# Patient Record
Sex: MALE
[De-identification: names, ages, dates, MRNs, and addresses within clinical notes are randomized per-mention and may not be internally consistent; named-entity substitution may affect disease eponyms.]

---

## 2022-08-25 ENCOUNTER — APPOINTMENT (OUTPATIENT)
Dept: AFTER HOURS CARE | Facility: EMERGENCY ROOM | Age: 52
End: 2022-08-25

## 2022-08-25 DIAGNOSIS — L23.7 ALLERGIC CONTACT DERMATITIS DUE TO PLANTS, EXCEPT FOOD: ICD-10-CM

## 2022-08-25 PROBLEM — Z00.00 ENCOUNTER FOR PREVENTIVE HEALTH EXAMINATION: Status: ACTIVE | Noted: 2022-08-25

## 2022-08-25 PROCEDURE — 99203 OFFICE O/P NEW LOW 30 MIN: CPT | Mod: NC,95

## 2022-08-25 NOTE — END OF VISIT
[Time Spent: ___ minutes] : I have spent [unfilled] minutes of time on the encounter. [>50% of the face to face encounter time was spent on counseling and/or coordination of care for ___] : Greater than 50% of the face to face encounter time was spent on counseling and/or coordination of care for [unfilled] 55

## 2022-10-03 NOTE — PHYSICAL EXAM
[EOMI] : extraocular movements intact [No Joint Swelling] : no joint swelling [Normal Gait] : normal gait [Speech Grossly Normal] : speech grossly normal [Normal Mood] : the mood was normal [Well Nourished] : well nourished [Coordination Grossly Intact] : coordination grossly intact [No Focal Deficits] : no focal deficits [Normal Affect] : the affect was normal [Normal Insight/Judgement] : insight and judgment were intact [No Acute Distress] : no acute distress [Well Developed] : well developed [Well-Appearing] : well-appearing [Normal Sclera/Conjunctiva] : normal sclera/conjunctiva [Normal Outer Ear/Nose] : the outer ears and nose were normal in appearance [No JVD] : no jugular venous distention [No Respiratory Distress] : no respiratory distress  [No Accessory Muscle Use] : no accessory muscle use [de-identified] : erythematous, pruritic rash on legs and buttocks

## 2022-10-03 NOTE — HISTORY OF PRESENT ILLNESS
[Home] : at home, [unfilled] , at the time of the visit. [Other Location: e.g. Home (Enter Location, City,State)___] : at [unfilled] [Spouse] : spouse [Verbal consent obtained from patient] : the patient, [unfilled] [FreeTextEntry8] : 53 yo male with no pmh pw erythematous, pruritic rash on legs and buttocks for 2 days. He had taken benadryl but it only made him tired  and did not help the rash.

## 2022-10-03 NOTE — HISTORY OF PRESENT ILLNESS
[Home] : at home, [unfilled] , at the time of the visit. [Other Location: e.g. Home (Enter Location, City,State)___] : at [unfilled] [Spouse] : spouse [Verbal consent obtained from patient] : the patient, [unfilled] [FreeTextEntry8] : 51 yo male with no pmh pw erythematous, pruritic rash on legs and buttocks for 2 days. He had taken benadryl but it only made him tired  and did not help the rash.

## 2022-10-03 NOTE — PHYSICAL EXAM
[EOMI] : extraocular movements intact [No Joint Swelling] : no joint swelling [Normal Gait] : normal gait [Speech Grossly Normal] : speech grossly normal [Normal Mood] : the mood was normal [Well Nourished] : well nourished [Coordination Grossly Intact] : coordination grossly intact [No Focal Deficits] : no focal deficits [Normal Affect] : the affect was normal [Normal Insight/Judgement] : insight and judgment were intact [No Acute Distress] : no acute distress [Well Developed] : well developed [Well-Appearing] : well-appearing [Normal Sclera/Conjunctiva] : normal sclera/conjunctiva [Normal Outer Ear/Nose] : the outer ears and nose were normal in appearance [No JVD] : no jugular venous distention [No Respiratory Distress] : no respiratory distress  [No Accessory Muscle Use] : no accessory muscle use [de-identified] : erythematous, pruritic rash on legs and buttocks

## 2022-10-03 NOTE — PHYSICAL EXAM
[EOMI] : extraocular movements intact [No Joint Swelling] : no joint swelling [Normal Gait] : normal gait [Speech Grossly Normal] : speech grossly normal [Normal Mood] : the mood was normal [Well Nourished] : well nourished [Coordination Grossly Intact] : coordination grossly intact [No Focal Deficits] : no focal deficits [Normal Affect] : the affect was normal [Normal Insight/Judgement] : insight and judgment were intact [No Acute Distress] : no acute distress [Well Developed] : well developed [Well-Appearing] : well-appearing [Normal Sclera/Conjunctiva] : normal sclera/conjunctiva [Normal Outer Ear/Nose] : the outer ears and nose were normal in appearance [No JVD] : no jugular venous distention [No Respiratory Distress] : no respiratory distress  [No Accessory Muscle Use] : no accessory muscle use [de-identified] : erythematous, pruritic rash on legs and buttocks

## 2022-10-03 NOTE — PLAN
[With new medications prescribed] : Treat in place: with new medications prescribed [FreeTextEntry1] : prednisone with taper

## 2022-12-25 ENCOUNTER — APPOINTMENT (OUTPATIENT)
Dept: AFTER HOURS CARE | Facility: EMERGENCY ROOM | Age: 52
End: 2022-12-25
Payer: SELF-PAY

## 2022-12-25 DIAGNOSIS — B34.9 VIRAL INFECTION, UNSPECIFIED: ICD-10-CM

## 2022-12-25 DIAGNOSIS — F17.210 NICOTINE DEPENDENCE, CIGARETTES, UNCOMPLICATED: ICD-10-CM

## 2022-12-25 DIAGNOSIS — Z78.9 OTHER SPECIFIED HEALTH STATUS: ICD-10-CM

## 2022-12-25 PROCEDURE — 99204 OFFICE O/P NEW MOD 45 MIN: CPT | Mod: 95

## 2022-12-25 NOTE — PHYSICAL EXAM
[No Acute Distress] : no acute distress [EOMI] : extraocular movements intact [Normal Outer Ear/Nose] : the outer ears and nose were normal in appearance [Supple] : supple [No Respiratory Distress] : no respiratory distress  [No Accessory Muscle Use] : no accessory muscle use [Normal Insight/Judgement] : insight and judgment were intact [de-identified] : alert and orinted

## 2022-12-25 NOTE — HISTORY OF PRESENT ILLNESS
[Home] : at home, [unfilled] , at the time of the visit. [Other Location: e.g. Home (Enter Location, City,State)___] : at [unfilled] [Family Member] : family member [Verbal consent obtained from patient] : the patient, [unfilled] [Spouse] : spouse [FreeTextEntry8] : 53 yo M with fever body aches and diarrhea for 1 days and works as  and was exposed to his wife who hwas ill few days ago and recovered and was covid neg

## 2022-12-25 NOTE — PLAN
[No new medications perscribed] : Treat in place: No new medications prescribed [Primary Care/Internal Medicine/PMD] : Primary Care/Internal Medicine/PMD [FreeTextEntry1] : 53 yo M with fever aches and diarrhea likely viral \par -- covid swab\par -- Symptom fever control\par -- hydration

## 2022-12-25 NOTE — REVIEW OF SYSTEMS
[Fever] : fever [Chills] : chills [Fatigue] : fatigue [Diarrhea] : diarrhea [Back Pain] : back pain [Vision Problems] : no vision problems [Nasal Discharge] : no nasal discharge [Chest Pain] : no chest pain [Shortness Of Breath] : no shortness of breath [Cough] : no cough [Abdominal Pain] : no abdominal pain [Nausea] : no nausea [Dysuria] : no dysuria [Vomiting] : no vomiting [Hematuria] : no hematuria [Joint Pain] : no joint pain [Itching] : no itching [Skin Rash] : no skin rash [Headache] : no headache [Dizziness] : no dizziness

## 2023-07-16 ENCOUNTER — APPOINTMENT (OUTPATIENT)
Dept: AFTER HOURS CARE | Facility: EMERGENCY ROOM | Age: 53
End: 2023-07-16
Payer: MEDICAID

## 2023-07-16 DIAGNOSIS — R22.0 LOCALIZED SWELLING, MASS AND LUMP, HEAD: ICD-10-CM

## 2023-07-16 PROCEDURE — 99213 OFFICE O/P EST LOW 20 MIN: CPT | Mod: 95

## 2023-07-16 RX ORDER — AMOXICILLIN 500 MG/1
500 CAPSULE ORAL 3 TIMES DAILY
Qty: 21 | Refills: 0 | Status: ACTIVE | COMMUNITY
Start: 2023-07-16 | End: 1900-01-01

## 2023-07-16 NOTE — PHYSICAL EXAM
[No Acute Distress] : no acute distress [Well Nourished] : well nourished [Well Developed] : well developed [Well-Appearing] : well-appearing [Normal Sclera/Conjunctiva] : normal sclera/conjunctiva [Normal Outer Ear/Nose] : the outer ears and nose were normal in appearance [No Respiratory Distress] : no respiratory distress  [No Rash] : no rash [Normal Affect] : the affect was normal [No Focal Deficits] : no focal deficits [Normal Insight/Judgement] : insight and judgment were intact [de-identified] : no appreciable swelling or abscess, airway patent

## 2023-07-16 NOTE — HISTORY OF PRESENT ILLNESS
[Other Location: e.g. School (Enter Location, City,State)___] : at [unfilled], at the time of the visit. [Other Location: e.g. Home (Enter Location, City,State)___] : at [unfilled] [Verbal consent obtained from patient] : the patient, [unfilled] [FreeTextEntry8] : 54 yo M with hx HTN  c/o R sided lower jaw swelling and feels tooth on that side is loose. No assoc fever, chills, difficulty swallowing or change in voice

## 2023-07-16 NOTE — PLAN
[FreeTextEntry1] : Start Amoxicillin 500 mg 3x daily for next 7 days Advil 3 tabs every 6 hrs with food as needed for pain Follow up with your dentist in the AM Call back or go to the ER for worsening symptoms

## 2023-07-16 NOTE — REVIEW OF SYSTEMS
[Fever] : no fever [Chills] : no chills [Vision Problems] : no vision problems [Earache] : no earache [Sore Throat] : no sore throat [Chest Pain] : no chest pain [Shortness Of Breath] : no shortness of breath [Abdominal Pain] : no abdominal pain [Headache] : no headache [FreeTextEntry4] : R facial swelling, loose R lower tooth

## 2023-07-16 NOTE — ASSESSMENT
[FreeTextEntry1] : On virtual exam pt awake and alert in NAD No appreciable STS noted on video exam Will start on po Abx  empirically until pt can f/u with his dentist

## 2023-09-09 ENCOUNTER — APPOINTMENT (OUTPATIENT)
Dept: AFTER HOURS CARE | Facility: EMERGENCY ROOM | Age: 53
End: 2023-09-09
Payer: MEDICAID

## 2023-09-09 DIAGNOSIS — I10 ESSENTIAL (PRIMARY) HYPERTENSION: ICD-10-CM

## 2023-09-09 PROCEDURE — 99214 OFFICE O/P EST MOD 30 MIN: CPT | Mod: 95

## 2023-09-09 PROCEDURE — 99204 OFFICE O/P NEW MOD 45 MIN: CPT | Mod: 95

## 2023-09-09 NOTE — PLAN
[With new medications prescribed] : Treat in place: with new medications prescribed [FreeTextEntry1] : You were evaluated for high blood pressure. A prescription for losartan was sent to the [facility] in [facility]. Please take daily as prescribed. Meeting a journal of your blood pressure readings morning, night, mid day to take to your follow up appointment. Please call your primary care doctor on Monday to set up a visit for a blood pressure management. Please seek immediate medical attention if your symptoms are worsening, you have severe headaches, chest pain, shortness of breath, or if you have any concerns.

## 2023-09-09 NOTE — HISTORY OF PRESENT ILLNESS
[Home] : at home, [unfilled] , at the time of the visit. [Other Location: e.g. Home (Enter Location, City,State)___] : at [unfilled] [Verbal consent obtained from patient] : the patient, [unfilled] [FreeTextEntry8] : 53M PMH of HTN presents with high blood pressure for several days. Readings: 149/93, 159/90, 160/95. Patient stopped blood pressure medications one year ago because he wanted to try natural methods of control. No chest pain, shortness of breath. Mild gradual global headache. Medications: Losartan (no other medications) NKDA  (Teladoc)

## 2023-10-07 ENCOUNTER — RX RENEWAL (OUTPATIENT)
Age: 53
End: 2023-10-07

## 2023-10-07 RX ORDER — LOSARTAN POTASSIUM 50 MG/1
50 TABLET, FILM COATED ORAL DAILY
Qty: 30 | Refills: 0 | Status: ACTIVE | COMMUNITY
Start: 2023-09-09 | End: 1900-01-01

## 2023-10-13 NOTE — ASSESSMENT
[FreeTextEntry1] : 51 yo man with poison ivy will give steroids Opzelura Pregnancy And Lactation Text: There is insufficient data to evaluate drug-associated risk for major birth defects, miscarriage, or other adverse maternal or fetal outcomes.  There is a pregnancy registry that monitors pregnancy outcomes in pregnant persons exposed to the medication during pregnancy.  It is unknown if this medication is excreted in breast milk.  Do not breastfeed during treatment and for about 4 weeks after the last dose.

## 2024-01-06 ENCOUNTER — APPOINTMENT (OUTPATIENT)
Dept: AFTER HOURS CARE | Facility: EMERGENCY ROOM | Age: 54
End: 2024-01-06
Payer: COMMERCIAL

## 2024-01-06 DIAGNOSIS — M25.539 PAIN IN UNSPECIFIED WRIST: ICD-10-CM

## 2024-01-06 DIAGNOSIS — L53.9 ERYTHEMATOUS CONDITION, UNSPECIFIED: ICD-10-CM

## 2024-01-06 PROCEDURE — L9997: CPT | Mod: 95

## 2024-01-06 RX ORDER — AMOXICILLIN AND CLAVULANATE POTASSIUM 875; 125 MG/1; MG/1
875-125 TABLET, COATED ORAL
Qty: 20 | Refills: 0 | Status: ACTIVE | COMMUNITY
Start: 2024-01-06 | End: 1900-01-01

## 2024-01-06 NOTE — PLAN
[FreeTextEntry1] : 1.  Take Ibuprofen 800 mg every 8 hours as needed for pain. 2.  Additionally you can take acetaminophen 975 mg every 8 hours as needed for pain. 3.  Start taking the prescribed antibiotic Augmentin twice per day for 10 days. 4.  See your orthopedic doctor as previously established on Monday. 5.  If you develop severe pain, worsening redness, warmth, swelling over the site of pain, fever, or any other major concern, please go to the closest ER right away and do not wait until Monday to be seen.
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
## 2024-01-06 NOTE — ASSESSMENT
[FreeTextEntry1] : Patient presents with worsening pain at the site of "a wrist cyst" given the increased pain, reported swelling and ink change of the skin, there is concern about developing cellulitis versus abscess versus joint infection.  I prescribed the patient an antibiotic in case there is an infectious cause, advised on increasing the ibuprofen to 800 mg 3 times a day and also advised him to add Tylenol to it up to 975mg TID.  I told him that if the symptoms continue to worsen over the next 48 hours, that he would need an immediate evaluation in the ER due to concern about worsening infection.  He understands this.  Otherwise, if his symptoms improve, I advised him to keep his appointment with orthopedics on Monday.

## 2024-06-09 ENCOUNTER — APPOINTMENT (OUTPATIENT)
Dept: AFTER HOURS CARE | Facility: EMERGENCY ROOM | Age: 54
End: 2024-06-09
Payer: COMMERCIAL

## 2024-06-09 ENCOUNTER — NON-APPOINTMENT (OUTPATIENT)
Age: 54
End: 2024-06-09

## 2024-06-09 ENCOUNTER — TRANSCRIPTION ENCOUNTER (OUTPATIENT)
Age: 54
End: 2024-06-09

## 2024-06-10 DIAGNOSIS — I10 ESSENTIAL (PRIMARY) HYPERTENSION: ICD-10-CM

## 2024-06-10 DIAGNOSIS — J02.9 ACUTE PHARYNGITIS, UNSPECIFIED: ICD-10-CM

## 2024-06-10 PROCEDURE — 99214 OFFICE O/P EST MOD 30 MIN: CPT

## 2024-06-10 RX ORDER — AMLODIPINE BESYLATE 5 MG/1
5 TABLET ORAL DAILY
Qty: 30 | Refills: 0 | Status: ACTIVE | COMMUNITY
Start: 2024-06-10 | End: 1900-01-01

## 2024-06-10 NOTE — PHYSICAL EXAM
[de-identified] : GENERAL: no acute distress, well-hydrated, well-appearing, nontoxic HEAD: normocephalic EYES: no scleral icterus, gaze conjugate NECK: trachea midline, Full ROM RESP: no resp distress, no supraclav rtrx, no stridor, normal 1:E ratio ABD: nondistended MSK: no gross deformity NEURO: alert & fully oriented SKIN: no rash PSYCH: cooperative

## 2024-06-10 NOTE — PLAN
[With new medications prescribed] : Treat in place: with new medications prescribed [FreeTextEntry1] : Strep PCR ordered apap and nsaids rx amlodipine

## 2024-06-10 NOTE — HISTORY OF PRESENT ILLNESS
[Home] : at home, [unfilled] , at the time of the visit. [Other Location: e.g. Home (Enter Location, City,State)___] : at [unfilled] [Spouse] : spouse [Verbal consent obtained from patient] : the patient, [unfilled] [FreeTextEntry8] : 54 year old M hx HTN now seen as telemedicine patient for chief complaint of 1d sore throat with a fever to 100.2. Sees a white in his throat. Mild cough. Requesting refill of amlodipine 5mg daily while he switches to new PMD.

## 2024-06-10 NOTE — HISTORY OF PRESENT ILLNESS
[Home] : at home, [unfilled] , at the time of the visit. [Other Location: e.g. Home (Enter Location, City,State)___] : at [unfilled] [Spouse] : spouse [Verbal consent obtained from patient] : the patient, [unfilled] [FreeTextEntry8] : 54 year old M hx HTN on amlodipne 5mg now seen as telemedicine patient for chief complaint of 1d sore throat with a fever to 100.2. Sees a white in his throat. Mild cough. Requesting refill of amlodipine 5mg daily while he switches to new PMD.

## 2024-06-10 NOTE — PHYSICAL EXAM
[de-identified] : GENERAL: no acute distress, well-hydrated, well-appearing, nontoxic HEAD: normocephalic EYES: no scleral icterus, gaze conjugate NECK: trachea midline, Full ROM RESP: no resp distress, no supraclav rtrx, no stridor, normal 1:E ratio ABD: nondistended MSK: no gross deformity NEURO: alert & fully oriented SKIN: no rash PSYCH: cooperative

## 2025-01-02 ENCOUNTER — APPOINTMENT (OUTPATIENT)
Dept: AFTER HOURS CARE | Facility: EMERGENCY ROOM | Age: 55
End: 2025-01-02
Payer: COMMERCIAL

## 2025-01-02 DIAGNOSIS — K04.7 PERIAPICAL ABSCESS W/OUT SINUS: ICD-10-CM

## 2025-01-02 PROCEDURE — 99214 OFFICE O/P EST MOD 30 MIN: CPT

## 2025-01-02 RX ORDER — AMOXICILLIN 500 MG/1
500 CAPSULE ORAL 3 TIMES DAILY
Qty: 21 | Refills: 0 | Status: ACTIVE | COMMUNITY
Start: 2025-01-02 | End: 1900-01-01